# Patient Record
Sex: FEMALE | Race: BLACK OR AFRICAN AMERICAN | Employment: FULL TIME | URBAN - METROPOLITAN AREA
[De-identification: names, ages, dates, MRNs, and addresses within clinical notes are randomized per-mention and may not be internally consistent; named-entity substitution may affect disease eponyms.]

---

## 2018-12-18 ENCOUNTER — APPOINTMENT (OUTPATIENT)
Dept: ULTRASOUND IMAGING | Facility: HOSPITAL | Age: 23
End: 2018-12-18
Attending: EMERGENCY MEDICINE

## 2018-12-18 ENCOUNTER — HOSPITAL ENCOUNTER (EMERGENCY)
Facility: HOSPITAL | Age: 23
Discharge: HOME | End: 2018-12-18
Attending: EMERGENCY MEDICINE

## 2018-12-18 ENCOUNTER — APPOINTMENT (OUTPATIENT)
Dept: CT IMAGING | Facility: HOSPITAL | Age: 23
End: 2018-12-18
Attending: EMERGENCY MEDICINE

## 2018-12-18 VITALS
HEIGHT: 64 IN | SYSTOLIC BLOOD PRESSURE: 103 MMHG | WEIGHT: 140 LBS | HEART RATE: 71 BPM | OXYGEN SATURATION: 100 % | BODY MASS INDEX: 23.9 KG/M2 | RESPIRATION RATE: 16 BRPM | DIASTOLIC BLOOD PRESSURE: 65 MMHG | TEMPERATURE: 98 F

## 2018-12-18 DIAGNOSIS — R10.9 ABDOMINAL PAIN, ACUTE: Primary | ICD-10-CM

## 2018-12-18 DIAGNOSIS — N83.201 CYST OF RIGHT OVARY: ICD-10-CM

## 2018-12-18 PROCEDURE — 76830 TRANSVAGINAL US NON-OB: CPT | Performed by: EMERGENCY MEDICINE

## 2018-12-18 PROCEDURE — 99284 EMERGENCY DEPT VISIT MOD MDM: CPT

## 2018-12-18 PROCEDURE — 74177 CT ABD & PELVIS W/CONTRAST: CPT | Performed by: EMERGENCY MEDICINE

## 2018-12-18 PROCEDURE — 80048 BASIC METABOLIC PNL TOTAL CA: CPT | Performed by: EMERGENCY MEDICINE

## 2018-12-18 PROCEDURE — 93975 VASCULAR STUDY: CPT | Performed by: EMERGENCY MEDICINE

## 2018-12-18 PROCEDURE — 81025 URINE PREGNANCY TEST: CPT

## 2018-12-18 PROCEDURE — 76856 US EXAM PELVIC COMPLETE: CPT | Performed by: EMERGENCY MEDICINE

## 2018-12-18 PROCEDURE — 85025 COMPLETE CBC W/AUTO DIFF WBC: CPT | Performed by: EMERGENCY MEDICINE

## 2018-12-18 PROCEDURE — 80076 HEPATIC FUNCTION PANEL: CPT | Performed by: EMERGENCY MEDICINE

## 2018-12-18 PROCEDURE — 83690 ASSAY OF LIPASE: CPT | Performed by: EMERGENCY MEDICINE

## 2018-12-18 PROCEDURE — 81001 URINALYSIS AUTO W/SCOPE: CPT | Performed by: EMERGENCY MEDICINE

## 2018-12-18 PROCEDURE — 36415 COLL VENOUS BLD VENIPUNCTURE: CPT

## 2018-12-18 RX ORDER — IBUPROFEN 800 MG/1
800 TABLET ORAL EVERY 8 HOURS PRN
COMMUNITY

## 2018-12-18 RX ORDER — IBUPROFEN 600 MG/1
600 TABLET ORAL EVERY 6 HOURS PRN
Qty: 30 TABLET | Refills: 0 | Status: SHIPPED | OUTPATIENT
Start: 2018-12-18 | End: 2018-12-25

## 2018-12-18 RX ORDER — HYDROCODONE BITARTRATE AND ACETAMINOPHEN 5; 325 MG/1; MG/1
1-2 TABLET ORAL EVERY 6 HOURS PRN
Qty: 10 TABLET | Refills: 0 | Status: SHIPPED | OUTPATIENT
Start: 2018-12-18 | End: 2018-12-25

## 2018-12-18 NOTE — ED PROVIDER NOTES
Patient Seen in: [facility] AND [facility] Emergency Department    History   Patient presents with:  Abdomen/Flank Pain (GI/)    Stated Complaint:     HPI    59-year-old female presents with pain in the right lower quadrant that started this morning.   Pain is quadrant and suprapubic area. There is no rebound and no guarding. Musculoskeletal: Normal range of motion. She exhibits no edema or tenderness. Lymphadenopathy:     She has no cervical adenopathy.    Neurological: She is alert and oriented to person, p Disposition and Plan     Clinical Impression:  Abdominal pain, acute  (primary encounter diagnosis)  Cyst of right ovary    Disposition:  Discharge  12/18/2018  4:03 pm    Follow-up:  Charles Varela MD  [place] [id number] [phone]

## 2018-12-18 NOTE — ED INITIAL ASSESSMENT (HPI)
Pt to ER with c/o right lower quadrant abdominal pain with painful urination that all started this morning after running. States pain is worse when she stands.

## 2019-07-26 ENCOUNTER — APPOINTMENT (OUTPATIENT)
Dept: GENERAL RADIOLOGY | Facility: HOSPITAL | Age: 24
End: 2019-07-26
Payer: COMMERCIAL

## 2019-07-26 ENCOUNTER — HOSPITAL ENCOUNTER (EMERGENCY)
Facility: HOSPITAL | Age: 24
Discharge: HOME | End: 2019-07-26
Payer: COMMERCIAL

## 2019-07-26 VITALS
BODY MASS INDEX: 24.8 KG/M2 | WEIGHT: 140 LBS | DIASTOLIC BLOOD PRESSURE: 75 MMHG | OXYGEN SATURATION: 99 % | HEIGHT: 63 IN | RESPIRATION RATE: 15 BRPM | HEART RATE: 83 BPM | TEMPERATURE: 99 F | SYSTOLIC BLOOD PRESSURE: 107 MMHG

## 2019-07-26 DIAGNOSIS — S62.92XA HAND FRACTURE, LEFT, CLOSED, INITIAL ENCOUNTER: Primary | ICD-10-CM

## 2019-07-26 PROCEDURE — 99284 EMERGENCY DEPT VISIT MOD MDM: CPT

## 2019-07-26 PROCEDURE — 73130 X-RAY EXAM OF HAND: CPT

## 2019-07-26 PROCEDURE — 29125 APPL SHORT ARM SPLINT STATIC: CPT

## 2019-07-26 RX ORDER — IBUPROFEN 600 MG/1
600 TABLET ORAL ONCE
Status: COMPLETED | OUTPATIENT
Start: 2019-07-26 | End: 2019-07-26
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
## 2019-07-26 NOTE — ED NOTES
Pt complains of pain, numbness, and limited mobility in left hand after \"slamming it in the trunk\". CMS intact.

## 2019-07-26 NOTE — ED PROVIDER NOTES
Patient Seen in: [facility] AND [facility] Emergency Department    History   Patient presents with:  Upper Extremity Injury (musculoskeletal)    Stated Complaint:     HPI    HPI: Braden Weber is a 21year old female who presents after an injury to the L hand NEURO:Sensation to touch is intact. SKIN: No open wounds, no rashes. PSYCH: Normal affect. Calm and cooperative.     Differential diagnosis to include fracture vs. Strain/sprain vs. contusion    ED Course   Labs Reviewed - No data to display    MDM

## 2019-08-14 ENCOUNTER — HOSPITAL ENCOUNTER (OUTPATIENT)
Dept: GENERAL RADIOLOGY | Facility: HOSPITAL | Age: 24
Discharge: HOME | End: 2019-08-14
Attending: SPECIALIST
Payer: COMMERCIAL

## 2019-08-14 DIAGNOSIS — S62.307S: ICD-10-CM

## 2019-08-14 PROCEDURE — 73130 X-RAY EXAM OF HAND: CPT | Performed by: SPECIALIST

## 2019-08-28 ENCOUNTER — HOSPITAL ENCOUNTER (OUTPATIENT)
Dept: GENERAL RADIOLOGY | Facility: HOSPITAL | Age: 24
Discharge: HOME | End: 2019-08-28
Attending: SPECIALIST
Payer: COMMERCIAL

## 2019-08-28 DIAGNOSIS — S62.92XA LEFT HAND FRACTURE: ICD-10-CM

## 2019-08-28 PROCEDURE — 73130 X-RAY EXAM OF HAND: CPT | Performed by: SPECIALIST

## 2019-09-11 ENCOUNTER — HOSPITAL ENCOUNTER (OUTPATIENT)
Dept: GENERAL RADIOLOGY | Facility: HOSPITAL | Age: 24
Discharge: HOME | End: 2019-09-11
Attending: SPECIALIST
Payer: COMMERCIAL

## 2019-09-11 DIAGNOSIS — T14.8XXA FRACTURE: ICD-10-CM

## 2019-09-11 PROCEDURE — 73130 X-RAY EXAM OF HAND: CPT | Performed by: SPECIALIST

## 2021-04-16 ENCOUNTER — HOSPITAL ENCOUNTER (EMERGENCY)
Facility: HOSPITAL | Age: 26
Discharge: HOME | End: 2021-04-16
Attending: EMERGENCY MEDICINE

## 2021-04-16 VITALS
OXYGEN SATURATION: 98 % | WEIGHT: 184 LBS | HEIGHT: 63 IN | SYSTOLIC BLOOD PRESSURE: 119 MMHG | DIASTOLIC BLOOD PRESSURE: 74 MMHG | RESPIRATION RATE: 16 BRPM | TEMPERATURE: 98 F | BODY MASS INDEX: 32.6 KG/M2 | HEART RATE: 85 BPM

## 2021-04-16 DIAGNOSIS — H04.302 DACROCYSTITIS, LEFT: Primary | ICD-10-CM

## 2021-04-16 PROCEDURE — 99283 EMERGENCY DEPT VISIT LOW MDM: CPT

## 2021-04-16 RX ORDER — CLINDAMYCIN HYDROCHLORIDE 300 MG/1
300 CAPSULE ORAL 3 TIMES DAILY
Qty: 21 CAPSULE | Refills: 0 | Status: SHIPPED | OUTPATIENT
Start: 2021-04-16 | End: 2021-04-23

## 2021-04-16 RX ORDER — POLYMYXIN B SULFATE AND TRIMETHOPRIM 1; 10000 MG/ML; [USP'U]/ML
1 SOLUTION OPHTHALMIC
Qty: 10 ML | Refills: 0 | Status: SHIPPED | OUTPATIENT
Start: 2021-04-16 | End: 2021-04-21

## 2021-04-16 NOTE — ED PROVIDER NOTES
Patient Seen in: [facility] AND [facility] Emergency Department      History   Patient presents with:  Eye Problem    Stated Complaint: L eye itchy/runny    HPI/Subjective:   HPI    48-year-old female with no significant past medical history here with complaints Temp src Oral   SpO2 98 %   O2 Device None (Room air)       Current:/74   Pulse 85   Temp 98.3 °F (36.8 °C) (Oral)   Resp 16   Ht 160 cm (5' 3\")   Wt 83.5 kg   LMP 03/25/2021   SpO2 98%   BMI 32.59 kg/m²         Physical Exam  Vitals and nursing n daily usage, considering nicotine replacement therapy and recommended the patient follow-up with their primary care physician to discuss medications such as Chantix and others to assist with smoking cessation.         Medical Record Review: I personally rev

## 2023-10-23 ENCOUNTER — HOSPITAL ENCOUNTER (EMERGENCY)
Facility: HOSPITAL | Age: 28
Discharge: HOME | End: 2023-10-23
Attending: STUDENT IN AN ORGANIZED HEALTH CARE EDUCATION/TRAINING PROGRAM
Payer: MEDICAID

## 2023-10-23 VITALS
SYSTOLIC BLOOD PRESSURE: 110 MMHG | RESPIRATION RATE: 18 BRPM | TEMPERATURE: 98 F | HEART RATE: 56 BPM | DIASTOLIC BLOOD PRESSURE: 74 MMHG | OXYGEN SATURATION: 100 %

## 2023-10-23 DIAGNOSIS — N61.1 BREAST ABSCESS: Primary | ICD-10-CM

## 2023-10-23 LAB — GLUCOSE BLDC GLUCOMTR-MCNC: 86 MG/DL (ref 70–99)

## 2023-10-23 PROCEDURE — 99284 EMERGENCY DEPT VISIT MOD MDM: CPT

## 2023-10-23 PROCEDURE — 82962 GLUCOSE BLOOD TEST: CPT

## 2023-10-23 RX ORDER — DOXYCYCLINE HYCLATE 100 MG/1
100 CAPSULE ORAL 2 TIMES DAILY
Qty: 14 CAPSULE | Refills: 0 | Status: SHIPPED | OUTPATIENT
Start: 2023-10-23 | End: 2023-10-30

## 2023-10-23 RX ORDER — IBUPROFEN 600 MG/1
600 TABLET ORAL ONCE
Status: COMPLETED | OUTPATIENT
Start: 2023-10-23 | End: 2023-10-23

## 2023-10-23 NOTE — ED INITIAL ASSESSMENT (HPI)
S: pt presents with redness and swelling to right breast x 2 days, believes she was bit by something. Denies drainage.

## 2023-10-23 NOTE — DISCHARGE INSTRUCTIONS
Thank you for seeking care at [facility] Emergency Department. You have been seen and evaluated and treated for an abscess. We discussed the results of your evaluation and work-up in the emergency department   Please read the instructions provided   If given prescriptions, take as instructed   Please keep the area clean, and wash the area gently with mild soap and warm water twice daily. Not all abscesses require antibiotics, but if antibiotics were prescribed, please complete the course of treatment as directed. Remember, your care process does not end after your visit today. Please follow-up with your doctor within 1-2 days for a follow-up check to ensure your symptoms are improving, to see if you need any further evaluation/testing, or to evaluate for any alternate diagnoses. If you notice increasing pain, increasing redness around the wound, increasing drainage, feeling lightheaded or dizzy, heavy bleeding, begin having fevers or chills, or feeling very ill you should contact your doctor or return to the emergency department.

## 2023-10-31 ENCOUNTER — HOSPITAL ENCOUNTER (EMERGENCY)
Facility: HOSPITAL | Age: 28
Discharge: HOME | End: 2023-10-31
Attending: EMERGENCY MEDICINE
Payer: MEDICAID

## 2023-10-31 VITALS
OXYGEN SATURATION: 99 % | SYSTOLIC BLOOD PRESSURE: 107 MMHG | HEART RATE: 50 BPM | TEMPERATURE: 98 F | RESPIRATION RATE: 18 BRPM | DIASTOLIC BLOOD PRESSURE: 82 MMHG

## 2023-10-31 DIAGNOSIS — N61.1 ABSCESS OF BREAST: Primary | ICD-10-CM

## 2023-10-31 PROCEDURE — 99283 EMERGENCY DEPT VISIT LOW MDM: CPT

## 2023-10-31 PROCEDURE — 99282 EMERGENCY DEPT VISIT SF MDM: CPT

## 2023-10-31 PROCEDURE — 99284 EMERGENCY DEPT VISIT MOD MDM: CPT

## 2023-10-31 RX ORDER — IBUPROFEN 600 MG/1
600 TABLET ORAL ONCE
Status: COMPLETED | OUTPATIENT
Start: 2023-10-31 | End: 2023-10-31

## 2023-10-31 NOTE — ED INITIAL ASSESSMENT (HPI)
Patient aox3 to ed via ems co of right breast abscess, reported was dx with breast abscess, given abx, followed with with clinic at [facility] and had ind. Patient reported was told to remove packing hersef but stated was bleeding and \"gauze\" was stuck.

## 2023-10-31 NOTE — DISCHARGE INSTRUCTIONS
Remove the packing in 3 days. He may then cover it with regular gauze. Please follow-up with your surgeon for wound reevaluation to ensure that it is healing well.

## 2023-11-16 ENCOUNTER — HOSPITAL ENCOUNTER (EMERGENCY)
Facility: HOSPITAL | Age: 28
Discharge: LEFT BEFORE DISCHARGE | End: 2023-11-16
Payer: MEDICAID

## 2023-11-16 VITALS
BODY MASS INDEX: 23.32 KG/M2 | OXYGEN SATURATION: 100 % | DIASTOLIC BLOOD PRESSURE: 71 MMHG | TEMPERATURE: 98 F | RESPIRATION RATE: 18 BRPM | HEIGHT: 65 IN | HEART RATE: 81 BPM | SYSTOLIC BLOOD PRESSURE: 120 MMHG | WEIGHT: 140 LBS
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
## 2024-05-01 ENCOUNTER — HOSPITAL ENCOUNTER (EMERGENCY)
Facility: HOSPITAL | Age: 29
Discharge: HOME | End: 2024-05-01
Attending: EMERGENCY MEDICINE

## 2024-05-01 VITALS
RESPIRATION RATE: 18 BRPM | HEART RATE: 70 BPM | OXYGEN SATURATION: 98 % | DIASTOLIC BLOOD PRESSURE: 85 MMHG | TEMPERATURE: 98 F | WEIGHT: 160 LBS | SYSTOLIC BLOOD PRESSURE: 115 MMHG | BODY MASS INDEX: 28.35 KG/M2 | HEIGHT: 63 IN

## 2024-05-01 DIAGNOSIS — R10.2 VAGINAL PAIN: Primary | ICD-10-CM

## 2024-05-01 LAB
B-HCG UR QL: NEGATIVE
BILIRUB UR QL: NEGATIVE
BV BACTERIA DNA VAG QL NAA+PROBE: POSITIVE
C GLABRATA DNA VAG QL NAA+PROBE: NEGATIVE
C KRUSEI DNA VAG QL NAA+PROBE: NEGATIVE
CANDIDA DNA VAG QL NAA+PROBE: NEGATIVE
CLARITY UR: CLEAR
COLOR UR: COLORLESS
GLUCOSE UR-MCNC: NORMAL MG/DL
HGB UR QL STRIP.AUTO: NEGATIVE
KETONES UR-MCNC: NEGATIVE MG/DL
LEUKOCYTE ESTERASE UR QL STRIP.AUTO: 25
NITRITE UR QL STRIP.AUTO: NEGATIVE
PH UR: 5.5 [PH] (ref 5–8)
PROT UR-MCNC: NEGATIVE MG/DL
SP GR UR STRIP: 1.01 (ref 1–1.03)
T VAGINALIS DNA VAG QL NAA+PROBE: NEGATIVE
UROBILINOGEN UR STRIP-ACNC: NORMAL

## 2024-05-01 PROCEDURE — 87491 CHLMYD TRACH DNA AMP PROBE: CPT | Performed by: EMERGENCY MEDICINE

## 2024-05-01 PROCEDURE — 99283 EMERGENCY DEPT VISIT LOW MDM: CPT

## 2024-05-01 PROCEDURE — 87591 N.GONORRHOEAE DNA AMP PROB: CPT | Performed by: EMERGENCY MEDICINE

## 2024-05-01 PROCEDURE — 81001 URINALYSIS AUTO W/SCOPE: CPT | Performed by: EMERGENCY MEDICINE

## 2024-05-01 PROCEDURE — 81514 NFCT DS BV&VAGINITIS DNA ALG: CPT | Performed by: EMERGENCY MEDICINE

## 2024-05-01 PROCEDURE — 99284 EMERGENCY DEPT VISIT MOD MDM: CPT

## 2024-05-01 PROCEDURE — 87086 URINE CULTURE/COLONY COUNT: CPT | Performed by: EMERGENCY MEDICINE

## 2024-05-01 PROCEDURE — 81025 URINE PREGNANCY TEST: CPT

## 2024-05-01 NOTE — ED INITIAL ASSESSMENT (HPI)
Patient states that she would like to get checked for STDs. Patient denies any vaginal discharge. Intermittent abdominal pain over the weekend. No fevers or vomiting.

## 2024-05-02 LAB
C TRACH DNA SPEC QL NAA+PROBE: NEGATIVE
N GONORRHOEA DNA SPEC QL NAA+PROBE: POSITIVE

## 2024-05-02 RX ORDER — METRONIDAZOLE 500 MG/1
500 TABLET ORAL 2 TIMES DAILY
Qty: 14 TABLET | Refills: 0 | Status: SHIPPED | OUTPATIENT
Start: 2024-05-02 | End: 2024-05-09

## 2024-05-02 NOTE — ED PROVIDER NOTES
Patient Seen in: [facility] Emergency Department      History     Chief Complaint   Patient presents with    Eval-G     Stated Complaint: possible STD exposure/no symptoms/ vaginal irritation no discharge    Subjective:   HPI    29-year-old otherwise healthy female presents for evaluation of vaginal pain.  Patient reports occasional episodes of vaginal discomfort.  Describes it as an irritation.  She denies itching or change in discharge.  She occasionally has discomfort with intercourse.  No fever, chills, nausea, vomiting, abdominal pain, diarrhea, dysuria.    Objective:   Past Medical History:    History of lumbar puncture    Migraines              History reviewed. No pertinent surgical history.             Social History     Socioeconomic History    Marital status: Single   Tobacco Use    Smoking status: Some Days    Smokeless tobacco: Never    Tobacco comments:     marijuana, not tobacco cigarettes.     Social Determinants of Health     Food Insecurity: No Food Insecurity (10/26/2023)    Received from [facility], [facility]    Food Insecurity     Within the past 30 days, I worried whether my food would run out before I got money to buy more. / En los últimos 30 días, me preocupó que la comida se podía acabar antes de tener dinero para compr...: Never true / Nunca     Within the past 30 days, the food that I bought just didn't last, and I didn't have money to get more. / En los últimos 30 días, La comida que compré no rindió lo suficiente, y no tenía dinero para...: Never true / Nunca              Review of Systems    Positive for stated complaint: possible STD exposure/no symptoms/ vaginal irritation no discharge  Other systems are as noted in HPI.  Constitutional and vital signs reviewed.      All other systems reviewed and negative except as noted above.    Physical Exam     ED Triage Vitals [05/01/24 0741]   /73   Pulse 72   Resp 20   Temp 97.6 °F  (36.4 °C)   Temp src Temporal   SpO2 99 %   O2 Device None (Room air)       Current:/85   Pulse 70   Temp 97.6 °F (36.4 °C) (Temporal)   Resp 18   Ht 160 cm (5' 3\")   Wt 72.6 kg   LMP 04/15/2024 (Exact Date)   SpO2 98%   BMI 28.34 kg/m²         Physical Exam  Vitals and nursing note reviewed.   Constitutional:       General: She is not in acute distress.     Appearance: She is well-developed.   HENT:      Head: Normocephalic and atraumatic.   Eyes:      Conjunctiva/sclera: Conjunctivae normal.   Cardiovascular:      Rate and Rhythm: Normal rate and regular rhythm.      Heart sounds: Normal heart sounds.   Pulmonary:      Effort: Pulmonary effort is normal. No respiratory distress.      Breath sounds: Normal breath sounds.   Abdominal:      General: Bowel sounds are normal. There is no distension.      Palpations: Abdomen is soft.      Tenderness: There is no abdominal tenderness. There is no guarding or rebound.   Genitourinary:     Comments: White fluid in vaginal vault, swabs collected, no CMT or adnexal tenderness bilaterally, no lesions  Musculoskeletal:         General: Normal range of motion.      Cervical back: Normal range of motion and neck supple.   Skin:     General: Skin is warm and dry.      Findings: No rash.   Neurological:      General: No focal deficit present.      Mental Status: She is alert and oriented to person, place, and time.               ED Course     Labs Reviewed   URINALYSIS WITH CULTURE REFLEX - Abnormal; Notable for the following components:       Result Value    Urine Color Colorless (*)     Leukocyte Esterase Urine 25 (*)     Squamous Epi. Cells Few (*)     All other components within normal limits   VAGINITIS VAGINOSIS PCR PANEL - Abnormal; Notable for the following components:    Bacterial Vaginosis Positive (*)     All other components within normal limits    Narrative:     This assay utilizes RT-PCR to detect the DNA of Gardnerella vaginalis, Lactobacillus spp. (L.  crispatus and L. jensenii), Atopobium vaginae, Bacterial Vaginosis Associated Bacteria-2 (BVAB-2), and Megasphaera-1. An algorithm is used to determine if the targets detected represent a vaginal microbiome consistent with bacterial vaginosis or normal vaginal van.  FDA approval was based on data in the 18 years and over population.       POCT PREGNANCY URINE - Normal   URINE CULTURE, ROUTINE   CHLAMYDIA/GONOCOCCUS, MARLO             Vitals:    05/01/24 0741 05/01/24 1009   BP: 113/73 115/85   Pulse: 72 70   Resp: 20 18   Temp: 97.6 °F (36.4 °C)    TempSrc: Temporal    SpO2: 99% 98%   Weight: 72.6 kg    Height: 160 cm (5' 3\")      *I personally reviewed and interpreted all ED vitals.           MDM        Medical Decision Making  Differential diagnosis includes but is not limited to STI, yeast, vaginitis, cystitis    Well-appearing patient, afebrile urine with isolated, 25 leukoesterase but no bacteria, will await cultures prior to making a decision about initiating antibiotics.  Discussed this plan with patient, advised outpatient follow-up with gynecology if not improving.  Patient verbalizes understanding of and agreement with this plan.    Problems Addressed:  Vaginal pain: acute illness or injury    Amount and/or Complexity of Data Reviewed  Labs: ordered.        Disposition and Plan     Clinical Impression:  1. Vaginal pain         Disposition:  Discharge  5/1/2024  9:59 am    Follow-up:  Lawrence Mayfield DO  [address] [facility] [address]  [phone]    Follow up            Medications Prescribed:  Discharge Medication List as of 5/1/2024 10:10 AM

## 2024-05-02 NOTE — PROGRESS NOTES
ED Culture Callback Results Review    Pharmacist reviewed culture results from ED visit .    Final urine culture, vaginal panel positive for untreated bacterial vaginosis and gonorrhea. Results discussed with Dr. Avina and recommend patient return to the ED, urgent care, or immediate care center for additional treatment for gonorrhea. A prescription for metronidazole was sent to [facility].      Patient was contacted and informed of the results. Patient was educated to inform all sexual partners from the previous 60-days prior to symptom onset of patient’s positive status and to abstain from sexual activity for 7 days after the end of therapy to prevent further spread. Patient verbalized understanding and all questions were answered. No further intervention required at this time.       Anjelica Peoples PharmD  Emergency Medicine Pharmacist Specialist  05/02/24; 2:12 PM

## 2024-05-03 ENCOUNTER — HOSPITAL ENCOUNTER (EMERGENCY)
Facility: HOSPITAL | Age: 29
Discharge: HOME | End: 2024-05-03
Attending: EMERGENCY MEDICINE

## 2024-05-03 VITALS
OXYGEN SATURATION: 99 % | TEMPERATURE: 98 F | RESPIRATION RATE: 18 BRPM | SYSTOLIC BLOOD PRESSURE: 103 MMHG | DIASTOLIC BLOOD PRESSURE: 70 MMHG | BODY MASS INDEX: 28.35 KG/M2 | HEART RATE: 95 BPM | WEIGHT: 160 LBS | HEIGHT: 63 IN

## 2024-05-03 DIAGNOSIS — A64 STD (FEMALE): Primary | ICD-10-CM

## 2024-05-03 PROCEDURE — 99284 EMERGENCY DEPT VISIT MOD MDM: CPT

## 2024-05-03 PROCEDURE — 96372 THER/PROPH/DIAG INJ SC/IM: CPT

## 2024-05-03 PROCEDURE — 99283 EMERGENCY DEPT VISIT LOW MDM: CPT

## 2024-05-03 NOTE — ED INITIAL ASSESSMENT (HPI)
Pt returns to ER for medication administration. Pt had STD testing done 2 days ago,  resulted +vaginitis and gonorrhea.

## 2024-05-03 NOTE — ED PROVIDER NOTES
Patient Seen in: [facility]         EMERGENCY DEPARTMENT NOTE    Dictated. Voice Transcription software has been utilized for this dictation (the reader should be aware that typographical errors are possible with voice transcription software and to please contact the dictating physician if there are questions.)         History     Chief Complaint   Patient presents with    Abnormal Result    Medication Administration       There may be discrepancies from triage note.     HPI    History provided by patient.  29-year-old immunocompetent female request treatment for gonorrhea and vaginitis.  States that she had STD panel completed in the emergency room a couple days ago and it tested positive for gonorrhea and vaginitis.  Upon chart review it appears patient is positive for gonorrhea and BV.  Patient has no symptoms.  No abdominal pain, no pelvic pain.  Denies vaginal discharge.  Reports immunocompetence.    No fevers, chills, nausea, vomiting, diarrhea, constipation, cough, cold symptoms, urinary complaints.  No chest pain, shortness of breath  No headache, neck pain, neck stiffness, incontinence.  No changes in mentation, no changes in vision, no total/new extremity weakness, no total/new extremity paresthesia, no difficulty speaking.  No alleviating or exacerbating factors.  Denies orthopnea, pnd, change in exercise tolerance limited by chest pain/sob , lower extremity edema/asymmetry.     History reviewed.   Past Medical History:    History of lumbar puncture    Migraines       History reviewed. History reviewed. No pertinent surgical history.      Medications :  (Not in a hospital admission)       No family history on file.    Smoking Status:   Social History     Socioeconomic History    Marital status: Single   Tobacco Use    Smoking status: Some Days    Smokeless tobacco: Never    Tobacco comments:     marijuana, not tobacco cigarettes.   Vaping Use    Vaping status: Never Used       Review of Systems    Constitutional: Negative.    HENT: Negative.     Eyes: Negative.    Respiratory: Negative.     Cardiovascular: Negative.    Gastrointestinal: Negative.    Genitourinary: Negative.    Musculoskeletal: Negative.    Skin: Negative.    Neurological: Negative.    Endo/Heme/Allergies: Negative.    Psychiatric/Behavioral: Negative.     All other systems reviewed and are negative.    Pertinent positives as listed.  All other organ systems are reviewed and are negative.    Constitutional and vital signs reviewed.      Social History and Family History elements reviewed from today, pertinent positives to the presenting problem noted.    Physical Exam     ED Triage Vitals [05/03/24 0818]   /70   Pulse 95   Resp 18   Temp 97.6 °F (36.4 °C)   Temp src Temporal   SpO2 99 %   O2 Device None (Room air)       All measures to prevent infection transmission during my interaction with the patient were taken. The patient was already wearing a droplet mask on my arrival to the room. Personal protective equipment including droplet mask, eye protection, and gloves were worn throughout the duration of the exam.  Handwashing was performed prior to and after the exam.  Stethoscope and any equipment used during my examination was cleaned with super sani-cloth germicidal wipes following the exam.     Physical Exam  Vitals and nursing note reviewed.   Constitutional:       General: She is not in acute distress.     Appearance: She is not ill-appearing or toxic-appearing.   Cardiovascular:      Rate and Rhythm: Normal rate and regular rhythm.   Pulmonary:      Effort: Pulmonary effort is normal. No respiratory distress.   Abdominal:      General: There is no distension.      Palpations: Abdomen is soft.      Tenderness: There is no abdominal tenderness. There is no guarding or rebound.      Comments: Negative Gaviria sign, negative McBurney's point tenderness     Musculoskeletal:      Right lower leg: No edema.      Left lower leg: No  edema.   Skin:     Capillary Refill: Capillary refill takes less than 2 seconds.   Neurological:      Mental Status: She is alert.      Comments: Gross motor and sensory function intact symmetrically and bilaterally to upper extremities and lower extremities.   Psychiatric:         Mood and Affect: Mood normal.         Behavior: Behavior normal.           Review of prior notes in Care everywhere/Epic performed by myself:        -Upon chart review, it appears patient was seen in the emergency department 5/2/2024 and had STD cultures sent.  It appears that thereafter  Dr. Avina already prescribed Flagyl p.o. twice daily for 7 days  ED Course     If labs obtained, they are personally reviewed by myself:   Labs Reviewed - No data to display    If radiologic studies ordered during today's ER visit, my independent interpretation are seen directly below.  This is awaiting the radiologist's final interpretation.      Imaging Results read by radiology in ED: No results found.        ED Medications Administered:   Medications   cefTRIAXone (Rocephin) 500 mg in lidocaine PF (Xylocaine-MPF) 1 % IM syringe for [facility] ED (500 mg Intramuscular Given 5/3/24 1008)           Vitals:    05/03/24 0818   BP: 103/70   Pulse: 95   Resp: 18   Temp: 97.6 °F (36.4 °C)   TempSrc: Temporal   SpO2: 99%   Weight: 72.6 kg   Height: 160 cm (5' 3\")     *I personally reviewed and interpreted all ED vitals.    Pulse Ox interpretation by myself: 99%, Room air, Normal   Medical Record Review: I personally reviewed available prior medical records for any recent pertinent discharge summaries, testing, and procedures and reviewed those reports.      [facility]     Medical decision making/ED Course:   29-year-old immunocompetent female requesting treatment for gonorrhea and bacterial vaginosis.  Flagyl already prescribed.  Patient encouraged to take this medication twice a day for 7 days.  Will give ceftriaxone intramuscularly x 1 here for gonorrhea.  Patient  encouraged to follow-up with primary/gynecology to be further tested for syphilis, HIV/hepatitis.  Safe sex practices discussed.  Strict return instructions given.  Patient with no vaginal discharge, pelvic pain, abdominal pain to suggest PID.    PID, torsion cholecystitis, AAA, dissection, pancreatitis, mesenteric ischemia, volvulus, bowel obstruction, appendicitis, among other life-threatening medical conditions considered and seems unlikely given patient's history, exam, and appearance.    Strict return instructions given.  Patient encouraged to follow-up with primary care provider in the next few days.  Advised to return to the emergency department for any worsening symptoms    Patient is non toxic appearing, is in no distress, hemodynamically stable.  Pt agrees and is aware of plan.       Differential Diagnosis:  as listed above in medical decision making.   *Please note that in the presenting to the emergency department, illness/injury that poses a threat to life or function is considered during this patient's initial evaluation.    The complexity of this visit is therefore inherently more complex given the need to consider life threatening pathology prior to any other etiology for this patient's visit.    The differential diagnosis and medical decision above exemplify this rationale.       Medical Decision Making  Problems Addressed:  STD (female): acute illness or injury    Amount and/or Complexity of Data Reviewed  External Data Reviewed: labs and notes.  Labs:  Decision-making details documented in ED Course.    Risk  Prescription drug management.               Vitals:    05/03/24 0818   BP: 103/70   Pulse: 95   Resp: 18   Temp: 97.6 °F (36.4 °C)   TempSrc: Temporal   SpO2: 99%   Weight: 72.6 kg   Height: 160 cm (5' 3\")             Complicating Factors: Significant medical problems that contribute to the complexity of this emergency room evaluation is listed above.    Condition upon leaving the department:  Stable    Disposition and Plan     Clinical Impression:  1. STD (female)        Disposition:  Discharge    Medications Prescribed:  Current Discharge Medication List          I have discussed the discharge plan with the patient and/or family or well wisher present in the room with the patient's permission.  They state that they understand and agree with the plan.  All questions regarding their care have been answered prior to discharge.  They are aware: Emergency Department is not intended to be a substitute for an effort to provide complete medical care. The imaging, if any, have often been interpreted on a preliminary basis pending final reading by the radiologist.  Instructed to return immediately to the ED if any changes or worsening of condition should occur.  If patient's blood pressure was greater than 140/90 today, patient encouraged to have this blood pressure rechecked with primary MD and blood pressure education provided.

## 2024-05-03 NOTE — ED QUICK NOTES
Pt able to dress independently and ambulates with steady gait. Discharge instructions reviewed and pt verbalized understanding.    
Pt states had positive STD testing (vaginitis and Gonorrhea) and here for medications.   
done

## 2024-05-03 NOTE — DISCHARGE INSTRUCTIONS
Please follow with your primary care provider in the next few days for reevaluation of your symptoms.  Please also get tested for syphilis, hepatitis, HIV with your primary care provider.  Return to emergency room for any worsening symptoms, pelvic pain, fevers of 100.4.  Follow with gynecology as an outpatient.      The Emergency Department is not intended to be a substitute for an effort to provide complete medical care. The imaging, if any, have often been interpreted on a preliminary basis pending final reading by the radiologist. If your blood pressure was greater than 140/90, please have this blood pressure rechecked by your primary care provider in the next several days.

## 2024-09-17 ENCOUNTER — HOSPITAL ENCOUNTER (EMERGENCY)
Facility: HOSPITAL | Age: 29
Discharge: HOME | End: 2024-09-17
Attending: EMERGENCY MEDICINE

## 2024-09-17 VITALS
HEART RATE: 67 BPM | OXYGEN SATURATION: 99 % | TEMPERATURE: 97 F | SYSTOLIC BLOOD PRESSURE: 119 MMHG | DIASTOLIC BLOOD PRESSURE: 85 MMHG | RESPIRATION RATE: 18 BRPM

## 2024-09-17 DIAGNOSIS — Z20.2 POSSIBLE EXPOSURE TO STD: ICD-10-CM

## 2024-09-17 DIAGNOSIS — N30.00 ACUTE CYSTITIS WITHOUT HEMATURIA: Primary | ICD-10-CM

## 2024-09-17 LAB
BILIRUB UR QL: NEGATIVE
COLOR UR: YELLOW
GLUCOSE UR-MCNC: NORMAL MG/DL
KETONES UR-MCNC: NEGATIVE MG/DL
LEUKOCYTE ESTERASE UR QL STRIP.AUTO: 500
NITRITE UR QL STRIP.AUTO: NEGATIVE
PH UR: 6.5 [PH] (ref 5–8)
SP GR UR STRIP: 1.01 (ref 1–1.03)
UROBILINOGEN UR STRIP-ACNC: NORMAL
WBC #/AREA URNS AUTO: >50 /HPF

## 2024-09-17 PROCEDURE — 87186 SC STD MICRODIL/AGAR DIL: CPT | Performed by: EMERGENCY MEDICINE

## 2024-09-17 PROCEDURE — 81001 URINALYSIS AUTO W/SCOPE: CPT | Performed by: EMERGENCY MEDICINE

## 2024-09-17 PROCEDURE — 87088 URINE BACTERIA CULTURE: CPT | Performed by: EMERGENCY MEDICINE

## 2024-09-17 PROCEDURE — 87086 URINE CULTURE/COLONY COUNT: CPT | Performed by: EMERGENCY MEDICINE

## 2024-09-17 PROCEDURE — 87591 N.GONORRHOEAE DNA AMP PROB: CPT | Performed by: EMERGENCY MEDICINE

## 2024-09-17 PROCEDURE — 99284 EMERGENCY DEPT VISIT MOD MDM: CPT

## 2024-09-17 PROCEDURE — 87491 CHLMYD TRACH DNA AMP PROBE: CPT | Performed by: EMERGENCY MEDICINE

## 2024-09-17 PROCEDURE — 96372 THER/PROPH/DIAG INJ SC/IM: CPT

## 2024-09-17 PROCEDURE — 99283 EMERGENCY DEPT VISIT LOW MDM: CPT

## 2024-09-17 RX ORDER — DOXYCYCLINE 100 MG/1
100 CAPSULE ORAL 2 TIMES DAILY
Qty: 14 CAPSULE | Refills: 0 | Status: SHIPPED | OUTPATIENT
Start: 2024-09-17 | End: 2024-09-24

## 2024-09-17 NOTE — ED INITIAL ASSESSMENT (HPI)
Patient presents with UTI symptoms : frequency, spasms, not fully emptying  For about 2 weeks.     Took medication for UTI about 3 weeks ago and didn't work     Looking for STD testing as well.

## 2024-09-17 NOTE — ED PROVIDER NOTES
Patient Seen in: [facility] Emergency Department    History     Chief Complaint   Patient presents with    Urinary Symptoms           Eval-G       HPI    29-year-old female presents ER with complaints of burning with urination.  Patient states that she also had intercourse with her boyfriend who she is broken up with so she is concerned about STDs.  Patient complaining of spasming of her bladder.  Patient states he was recently treated for urinary tract infections and symptoms improved but then now come back.  Patient complain of cloudy urine as well.    History reviewed.   Past Medical History:    History of lumbar puncture    Migraines       History reviewed. History reviewed. No pertinent surgical history.      Medications :  (Not in a hospital admission)       History reviewed. No pertinent family history.    Smoking Status:   Social History     Socioeconomic History    Marital status: Single   Tobacco Use    Smoking status: Some Days    Smokeless tobacco: Never    Tobacco comments:     marijuana, not tobacco cigarettes.   Vaping Use    Vaping status: Never Used       ROS  All pertinent positives for the review of systems are mentioned in the HPI  All other organ systems are reviewed and are negative.    Constitutional and vital signs reviewed.      Social History and Family History elements reviewed from today, pertinent positives to the presenting problem noted.    Physical Exam     ED Triage Vitals [09/17/24 0637]   /52   Pulse 89   Resp 22   Temp 97.4 °F (36.3 °C)   Temp src Temporal   SpO2 97 %   O2 Device None (Room air)       All measures to prevent infection transmission during my interaction with the patient were taken. The patient was already wearing a droplet mask on my arrival to the room. Personal protective equipment including droplet mask, eye protection, and gloves were worn throughout the duration of the exam.  Handwashing was performed prior to and after the exam.  Stethoscope and  any equipment used during my examination was cleaned with super sani-cloth germicidal wipes following the exam.     Physical Exam  Vitals and nursing note reviewed.   Constitutional:       Appearance: She is well-developed.   HENT:      Head: Normocephalic and atraumatic.      Right Ear: External ear normal.      Left Ear: External ear normal.      Nose: Nose normal.   Eyes:      Conjunctiva/sclera: Conjunctivae normal.      Pupils: Pupils are equal, round, and reactive to light.   Cardiovascular:      Rate and Rhythm: Normal rate and regular rhythm.      Heart sounds: Normal heart sounds.   Pulmonary:      Effort: Pulmonary effort is normal.      Breath sounds: Normal breath sounds.   Abdominal:      General: Bowel sounds are normal. There is no distension.      Palpations: Abdomen is soft. There is no mass.      Tenderness: There is no abdominal tenderness.      Hernia: No hernia is present.   Musculoskeletal:         General: Normal range of motion.      Cervical back: Normal range of motion and neck supple.   Skin:     General: Skin is warm and dry.   Neurological:      Mental Status: She is alert and oriented to person, place, and time.      Deep Tendon Reflexes: Reflexes are normal and symmetric.   Psychiatric:         Behavior: Behavior normal.         Thought Content: Thought content normal.         Judgment: Judgment normal.         ED Course        Labs Reviewed   URINALYSIS WITH CULTURE REFLEX - Abnormal; Notable for the following components:       Result Value    Clarity Urine Ex.Turbid (*)     Blood Urine 1+ (*)     Protein Urine Trace (*)     Leukocyte Esterase Urine 500 (*)     WBC Urine >50 (*)     RBC Urine 3-5 (*)     Squamous Epi. Cells Few (*)     All other components within normal limits   URINE CULTURE, ROUTINE   CHLAMYDIA/GONOCOCCUS, MARLO         Imaging Results Available and Reviewed while in ED: No results found.  ED Medications Administered:   Medications   cefTRIAXone (Rocephin) 500 mg in  lidocaine PF (Xylocaine-MPF) 1 % IM syringe for [facility] ED (500 mg Intramuscular Given 9/17/24 0801)         MDM     Vitals:    09/17/24 0637   BP: 112/52   Pulse: 89   Resp: 22   Temp: 97.4 °F (36.3 °C)   TempSrc: Temporal   SpO2: 97%     *I personally reviewed and interpreted all ED vitals.  I also personally reviewed all labs and imaging if ordered    Pulse Ox: 97%, Room air, Normal     Monitor Interpretation:   normal sinus rhythm    Differential Diagnosis/ Diagnostic Considerations: UTI, STD exposure, bladder spasm    Medical Record Review: I personally reviewed available prior medical records for any recent pertinent discharge summaries, testing, and procedures and reviewed those reports.    Complicating Factors: The patient already has does not have a problem list on file. to contribute to the complexity of this ED evaluation.    Medical Decision Making  29-year-old female presents ER with complaint of burning with urination.  Patient with a positive UTI.  Patient requesting STD testing.  Culture sent for GC and chlamydia.  Patient treated with Rocephin for 500 mg IM and given prescription for doxycycline for 7 days.  Patient instructed to follow-up with the results of her culture on her MyChart.    Problems Addressed:  Acute cystitis without hematuria: acute illness or injury  Possible exposure to STD: acute illness or injury    Amount and/or Complexity of Data Reviewed  Labs: ordered. Decision-making details documented in ED Course.    Risk  Prescription drug management.        Condition upon leaving the department: Stable    Disposition and Plan     Clinical Impression:  1. Acute cystitis without hematuria    2. Possible exposure to STD        Disposition:  Discharge    Follow-up:  Ovidio Lindsey MD  [address] [place] [address]  [phone]    Schedule an appointment as soon as possible for a visit  If symptoms worsen      Medications Prescribed:  Current Discharge Medication List        START  taking these medications    Details   doxycycline 100 MG Oral Cap Take 1 capsule (100 mg total) by mouth 2 (two) times daily for 7 days.  Qty: 14 capsule, Refills: 0

## 2024-09-18 LAB
C TRACH DNA SPEC QL NAA+PROBE: NEGATIVE
N GONORRHOEA DNA SPEC QL NAA+PROBE: NEGATIVE

## 2024-09-19 RX ORDER — CEPHALEXIN 500 MG/1
500 CAPSULE ORAL 2 TIMES DAILY
Qty: 10 CAPSULE | Refills: 0 | Status: SHIPPED | OUTPATIENT
Start: 2024-09-19 | End: 2024-09-24

## 2024-09-20 NOTE — PROGRESS NOTES
ED Culture Callback Results Review    Pharmacist reviewed culture results from ED visit .    Final urine culture positive for E. coli that is not susceptible to previously prescribed Doxycycline (Vibramycin) therapy.     A new prescription for keflex was electronically sent to [facility] as discussed with Dr. Rodriguez. Unable to reach patient after three attempts. A certified letter has been requested.    Aamir Inman PharmD  Emergency Medicine Pharmacist Specialist  09/20/24; 1:24 PM

## 2024-11-29 ENCOUNTER — HOSPITAL ENCOUNTER (EMERGENCY)
Facility: HOSPITAL | Age: 29
Discharge: HOME | End: 2024-11-29
Attending: EMERGENCY MEDICINE

## 2024-11-29 VITALS
SYSTOLIC BLOOD PRESSURE: 123 MMHG | BODY MASS INDEX: 28.35 KG/M2 | HEART RATE: 78 BPM | OXYGEN SATURATION: 100 % | TEMPERATURE: 97 F | DIASTOLIC BLOOD PRESSURE: 86 MMHG | WEIGHT: 160 LBS | HEIGHT: 63 IN | RESPIRATION RATE: 18 BRPM

## 2024-11-29 DIAGNOSIS — L02.213 CHEST WALL ABSCESS: Primary | ICD-10-CM

## 2024-11-29 PROCEDURE — 10061 I&D ABSCESS COMP/MULTIPLE: CPT

## 2024-11-29 PROCEDURE — 99283 EMERGENCY DEPT VISIT LOW MDM: CPT

## 2024-11-29 PROCEDURE — 99284 EMERGENCY DEPT VISIT MOD MDM: CPT

## 2024-11-29 RX ORDER — DOXYCYCLINE 100 MG/1
100 CAPSULE ORAL 2 TIMES DAILY
Qty: 14 CAPSULE | Refills: 0 | Status: SHIPPED | OUTPATIENT
Start: 2024-11-29 | End: 2024-12-06

## 2024-11-29 NOTE — ED PROVIDER NOTES
Patient Seen in: [facility] Emergency Department    History     Chief Complaint   Patient presents with    Abscess       HPI    History is provided by patient/independent historian: patient  29 year old female with hx of breast abscess, here with L breast pain/swelling for the past few days. + redness, no active drainage. No fevers.     History reviewed.   Past Medical History:    Abscess    History of lumbar puncture    Migraines         History reviewed. History reviewed. No pertinent surgical history.      Home Medications reviewed :  Prescriptions Prior to Admission[1]      History reviewed.   Social History     Socioeconomic History    Marital status: Single   Tobacco Use    Smoking status: Never     Passive exposure: Never    Smokeless tobacco: Never   Vaping Use    Vaping status: Never Used   Substance and Sexual Activity    Alcohol use: Yes     Comment: social    Drug use: Not Currently         ROS  Review of Systems   Respiratory:  Negative for shortness of breath.    Cardiovascular:  Negative for chest pain.   Skin:  Positive for wound.   All other systems reviewed and are negative.     All other pertinent organ systems are reviewed and are negative.      Physical Exam     ED Triage Vitals [11/29/24 0800]   /80   Pulse 80   Resp 16   Temp 97 °F (36.1 °C)   Temp src Temporal   SpO2 99 %   O2 Device None (Room air)     Vital signs reviewed.      Physical Exam  Vitals and nursing note reviewed.   Cardiovascular:      Pulses: Normal pulses.   Pulmonary:      Effort: No respiratory distress.   Chest:       Abdominal:      General: There is no distension.   Neurological:      Mental Status: She is alert.         ED Course       Labs:   Labs Reviewed - No data to display      My EKG Interpretation:   As reviewed and Interpreted by me      Imaging Results Available and Reviewed while in ED:   No results found.    Decision rules/scores evaluated: none      Diagnostic labs/tests considered but not  ordered: CBC, BMp, aerobic culture, CXR    ED Medications Administered: Medications - No data to display             MDM       Medical Decision Making      Differential Diagnosis: After obtaining the patient's history, performing the physical exam and reviewing the diagnostics, multiple initial diagnoses were considered based on the presenting problem including breast abscess, sebaceous cyst, cellulitis    External document review: I personally reviewed available external medical records for any recent pertinent discharge summaries, testing, and procedures - the findings are as follows: 9/17/24 visit with Dr. Liriano for cystitis    Complicating Factors: The patient already  has a past medical history of Abscess, History of lumbar puncture, and Migraines. to contribute to the complexity of this ED evaluation.    Procedures performed:   PROCEDURE: Simple/  Incision & Drainage of Abscess  : Jeovanny Herrera MD  Location:  left breast    The patient / caregivers were apprised of diagnostic / treatment options including alternate modes of care, in addition to risks and benefits, for this medical condition. Based on this discussion the patient / caregiver agree with this chosen diagnostic and treatment plan and verbal consent was obtained.    Timeout was performed and the correct patient, site, and location was confirmed prior to initiation.  Sterile prep and drape was preformed.  Local analgesia was obtained using lidocaine 1%.   An incision was made at the point of greatest fluctuance and sanguo- purulent fluid was obtained.  Loculations disrupted.    sterile packing was inserted.  No complications were noted.     Patient instructed to follow up with their PMD or return to the ED for wound check in 3-5 days.      Discussed management with physician/appropriate source: none    Considered admission/deescalation of care for: none    Social determinants of health affecting patient care: none    Prescription  medications considered: doxycycline, discussed continuing current medication regimen    The patient requires continuous monitoring for: breast abscess    Shared decision making: discussed possible admission        Disposition and Plan     Clinical Impression:  1. Chest wall abscess        Disposition:  Discharge    Follow-up:  [facility]    Schedule an appointment as soon as possible for a visit in 3 day(s)  for packing removal      Medications Prescribed:  Current Discharge Medication List                         [1] (Not in a hospital admission)

## 2024-11-29 NOTE — ED INITIAL ASSESSMENT (HPI)
Pt came in for abscess to left breast. Painful to touch per pt. Afebrile. RR even and nonlabored, speaking in full sentences, ambulatory with steady gait.

## 2024-12-02 ENCOUNTER — HOSPITAL ENCOUNTER (EMERGENCY)
Facility: HOSPITAL | Age: 29
Discharge: HOME | End: 2024-12-02
Attending: EMERGENCY MEDICINE

## 2024-12-02 VITALS
HEART RATE: 87 BPM | BODY MASS INDEX: 29.4 KG/M2 | RESPIRATION RATE: 18 BRPM | DIASTOLIC BLOOD PRESSURE: 64 MMHG | WEIGHT: 168 LBS | TEMPERATURE: 98 F | OXYGEN SATURATION: 99 % | SYSTOLIC BLOOD PRESSURE: 115 MMHG | HEIGHT: 63.5 IN

## 2024-12-02 DIAGNOSIS — Z48.00 ABSCESS PACKING REMOVAL: Primary | ICD-10-CM

## 2024-12-02 PROCEDURE — 99281 EMR DPT VST MAYX REQ PHY/QHP: CPT

## 2024-12-02 NOTE — ED INITIAL ASSESSMENT (HPI)
Present to ED for packing removal from left chest. Packing placed on Friday. Denies any s/s of infection. No other complaints.

## 2024-12-02 NOTE — DISCHARGE INSTRUCTIONS
Thank you for seeking care at [facility] Emergency Department.  You have been seen and evaluated and treated for an abscess on a prior ER visit and had packing removed today.     We discussed the results of your evaluation and work-up in the emergency department   Please read the instructions provided   If given prescriptions, take as instructed   Please keep the area clean, and wash the area gently with mild soap and warm water twice daily.    Not all abscesses require antibiotics, but if antibiotics were prescribed, please complete the course of treatment as directed.    Remember, your care process does not end after your visit today. Please follow-up with your doctor within 1-2 days for a follow-up check to ensure your symptoms are improving, to see if you need any further evaluation/testing, or to evaluate for any alternate diagnoses.     If you notice increasing pain, increasing redness around the wound, increasing drainage, feeling lightheaded or dizzy, heavy bleeding, begin having fevers or chills, or feeling very ill you should contact your doctor or return to the emergency department.

## 2024-12-02 NOTE — ED PROVIDER NOTES
[facility] Emergency Department Note  Patient: Gabriela Oconnor Age: 29 year old Sex: female      MRN: [id number]  : 1995    Patient Seen in: [facility] Emergency Department    History   No chief complaint on file.    Stated Complaint: packing removal    History obtained from: patient     Very pleasant 29-year-old female presents to the ER for evaluation of her left chest wall abscess which was packed a few days ago requesting packing removal.  States has still had swelling and drainage.  No fevers, chills, or increased redness.  Has been compliant with antibiotics.  She denies any other complaints at this time.    Review of Systems:  Review of Systems  Positive for stated complaint: packing removal. Constitutional and vital signs reviewed. All other systems reviewed and negative except as noted above.    Patient History:  Past Medical History:    Abscess    History of lumbar puncture    Migraines       History reviewed. No pertinent surgical history.     No family history on file.    Specific Social Determinants of Health:   Social History     Socioeconomic History    Marital status: Single   Tobacco Use    Smoking status: Never     Passive exposure: Never    Smokeless tobacco: Never   Vaping Use    Vaping status: Never Used   Substance and Sexual Activity    Alcohol use: Yes     Comment: social    Drug use: Not Currently     Social Drivers of Health     Food Insecurity: No Food Insecurity (10/26/2023)    [facility]    Food Insecurity     Within the past 30 days, I worried whether my food would run out before I got money to buy more. / En los últimos 30 días, me preocupó que la comida se podía acabar antes de tener dinero para compr...: Never true / Nunca     Within the past 30 days, the food that I bought just didn't last, and I didn't have money to get more. / En los últimos 30 días, La comida que compré no rindió lo suficiente, y no  tenía dinero para...: Never true / Nunca           PSFH elements reviewed from today and agreed except as otherwise stated in HPI.    Physical Exam     ED Triage Vitals [12/02/24 1701]   /64   Pulse 87   Resp 18   Temp 98 °F (36.7 °C)   Temp src    SpO2 99 %   O2 Device None (Room air)       Current:/64   Pulse 87   Temp 98 °F (36.7 °C)   Resp 18   Ht 161.3 cm (5' 3.5\")   Wt 76.2 kg   LMP 11/08/2024 (Exact Date)   SpO2 99%   BMI 29.29 kg/m²         Physical Exam  Vitals and nursing note reviewed.   Constitutional:       General: She is not in acute distress.     Appearance: She is not ill-appearing.   HENT:      Head: Normocephalic and atraumatic.      Right Ear: External ear normal.      Left Ear: External ear normal.      Nose: Nose normal.      Mouth/Throat:      Mouth: Mucous membranes are moist.   Eyes:      Conjunctiva/sclera: Conjunctivae normal.   Cardiovascular:      Rate and Rhythm: Normal rate and regular rhythm.      Heart sounds: No murmur heard.  Pulmonary:      Effort: Pulmonary effort is normal. No respiratory distress.      Breath sounds: No wheezing or rales.   Chest:       Abdominal:      General: There is no distension.   Musculoskeletal:         General: No deformity.   Skin:     General: Skin is warm and dry.      Capillary Refill: Capillary refill takes less than 2 seconds.   Neurological:      General: No focal deficit present.      Mental Status: She is alert.             MDM   Very pleasant 29-year-old female presents for packing removal after recent incision and drainage completed on 11/29/2024 in this ER, and chart review patient had this packing placed after incision and drainage and was discharged on doxycycline.  She has been compliant with antibiotics.  She is well-appearing with normal vitals.  Clinically I do not suspect any worsening of cellulitis or abscess and feel it is improving.  I advised her to continue local wound cares, warm compresses and follow-up  with primary doctor in 2 to 3 days.  Return if new or worsening symptoms occur immediately.  She is comfortable to plan for discharge at this time.  I do not feel any labs or imaging are indicated at this time.              Procedures:  Procedures        Disposition and Plan     Clinical Impression:  1. Abscess packing removal        Disposition:  Discharge    Follow-up:  [facility] [address] [id number] [phone]    Schedule an appointment as soon as possible for a visit in 2 day(s)      [facility] Emergency Department  [address] [facility] [address]  [phone]  Go to  If symptoms worsen, immediately      Medications Prescribed:  Current Discharge Medication List            This note may have been created using voice dictation technology and may include inadvertent errors.      Lorena Da Silva DO  Attending Physician   Emergency Medicine

## 2025-04-26 ENCOUNTER — HOSPITAL ENCOUNTER (EMERGENCY)
Facility: HOSPITAL | Age: 30
Discharge: HOME | End: 2025-04-26
Attending: EMERGENCY MEDICINE

## 2025-04-26 VITALS
DIASTOLIC BLOOD PRESSURE: 81 MMHG | RESPIRATION RATE: 16 BRPM | SYSTOLIC BLOOD PRESSURE: 117 MMHG | WEIGHT: 203.06 LBS | HEIGHT: 63 IN | HEART RATE: 84 BPM | BODY MASS INDEX: 35.98 KG/M2 | TEMPERATURE: 98 F | OXYGEN SATURATION: 100 %

## 2025-04-26 DIAGNOSIS — N30.00 ACUTE CYSTITIS WITHOUT HEMATURIA: Primary | ICD-10-CM

## 2025-04-26 LAB
B-HCG UR QL: NEGATIVE
BILIRUB UR QL: NEGATIVE
GLUCOSE UR-MCNC: NORMAL MG/DL
KETONES UR-MCNC: NEGATIVE MG/DL
LEUKOCYTE ESTERASE UR QL STRIP.AUTO: 500
NITRITE UR QL STRIP.AUTO: NEGATIVE
PH UR: 6 [PH] (ref 5–8)
PROT UR-MCNC: NEGATIVE MG/DL
RBC #/AREA URNS AUTO: >10 /HPF
SP GR UR STRIP: 1.01 (ref 1–1.03)
UROBILINOGEN UR STRIP-ACNC: NORMAL
WBC #/AREA URNS AUTO: >50 /HPF
WBC CLUMPS UR QL AUTO: PRESENT /HPF

## 2025-04-26 PROCEDURE — 99284 EMERGENCY DEPT VISIT MOD MDM: CPT

## 2025-04-26 PROCEDURE — 99283 EMERGENCY DEPT VISIT LOW MDM: CPT

## 2025-04-26 PROCEDURE — 87086 URINE CULTURE/COLONY COUNT: CPT | Performed by: EMERGENCY MEDICINE

## 2025-04-26 PROCEDURE — 81001 URINALYSIS AUTO W/SCOPE: CPT | Performed by: EMERGENCY MEDICINE

## 2025-04-26 PROCEDURE — 81025 URINE PREGNANCY TEST: CPT

## 2025-04-26 PROCEDURE — 87088 URINE BACTERIA CULTURE: CPT | Performed by: EMERGENCY MEDICINE

## 2025-04-26 PROCEDURE — 87186 SC STD MICRODIL/AGAR DIL: CPT | Performed by: EMERGENCY MEDICINE

## 2025-04-26 RX ORDER — CEPHALEXIN 500 MG/1
500 CAPSULE ORAL 3 TIMES DAILY
Qty: 21 CAPSULE | Refills: 0 | Status: SHIPPED | OUTPATIENT
Start: 2025-04-26 | End: 2025-05-03

## 2025-04-26 NOTE — ED PROVIDER NOTES
Patient Seen in: [facility] Emergency Department    History     Chief Complaint   Patient presents with    Eval-G    Urinary Symptoms       HPI    30-year-old female who presents here today complaining of some dysuria along with urinary urgency and frequency for the past couple days.  Feels like when she had a UTI in the past.  Denies any nausea/vomiting/diarrhea.  No chest pain or shortness of breath    History reviewed. Past Medical History[1]    History reviewed. Past Surgical History[2]      Medications :  Prescriptions Prior to Admission[3]     Family History[4]    Smoking Status: Social Hx on file[5]    Constitutional and vital signs reviewed.      Social History and Family History elements reviewed from today, pertinent positives to the presenting problem noted.    Physical Exam     ED Triage Vitals [04/26/25 0653]   /81   Pulse 84   Resp 16   Temp 98.3 °F (36.8 °C)   Temp src Oral   SpO2 100 %   O2 Device None (Room air)       All measures to prevent infection transmission during my interaction with the patient were taken. Handwashing was performed prior to and after the exam.  Stethoscope and any equipment used during my examination was cleaned with super sani-cloth germicidal wipes following the exam.     Physical Exam  Vitals reviewed.   Cardiovascular:      Rate and Rhythm: Normal rate.   Pulmonary:      Effort: Pulmonary effort is normal.   Abdominal:      Comments: Mild Suprapubic tenderness with palpation   Skin:     General: Skin is warm and dry.   Neurological:      Mental Status: She is alert and oriented to person, place, and time.         ED Course        Labs Reviewed   URINALYSIS WITH CULTURE REFLEX - Abnormal; Notable for the following components:       Result Value    Clarity Urine Turbid (*)     Blood Urine 2+ (*)     Leukocyte Esterase Urine 500 (*)     WBC Urine >50 (*)     RBC Urine >10 (*)     Bacteria Urine Rare (*)     Squamous Epi. Cells Few (*)     WBC Clump Present  (*)     All other components within normal limits   POCT PREGNANCY URINE - Normal   URINE CULTURE, ROUTINE       As Interpreted by me    Imaging Results Available and Reviewed while in ED: No results found.  ED Medications Administered: Medications - No data to display      MDM     Vitals:    04/26/25 0653   BP: 117/81   Pulse: 84   Resp: 16   Temp: 98.3 °F (36.8 °C)   TempSrc: Oral   SpO2: 100%   Weight: 92.1 kg   Height: 160 cm (5' 3\")     *I personally reviewed and interpreted all ED vitals.    Pulse Ox: 100%, Room air, Normal       Differential Diagnosis/ Diagnostic Considerations: GI, pyelonephritis, sterile pyuria    Complicating Factors: The patient already has does not have a problem list on file. to contribute to the complexity of this ED evaluation.    Medical Decision Making  Amount and/or Complexity of Data Reviewed  External Data Reviewed: labs.     Details: Also look for previous cultures with sensitivity results  Labs: ordered. Decision-making details documented in ED Course.    Risk  OTC drugs.  Prescription drug management.    I reviewed all results with the patient.  Patient does have a UTI.  Will treat with Keflex.  Reviewed previous labs and saw susceptibility to Keflex previously.  I explained the patient to take the antibiotics as prescribed.  Follow-up with her primary care provider in 1 to 2 days.  Return to the ER if symptoms continue, get worse, unable to follow-up    Condition upon leaving the department: Stable    Disposition and Plan     Clinical Impression:  1. Acute cystitis without hematuria        Disposition:  Discharge    Follow-up:  Elaine Win MD  [address] [place] [id number]  [phone]    Follow up        Medications Prescribed:  Discharge Medication List as of 4/26/2025  7:49 AM        START taking these medications    Details   cephALEXin 500 MG Oral Cap Take 1 capsule (500 mg total) by mouth 3 (three) times daily for 7 days., Normal, Disp-21 capsule, R-0                               [1]   Past Medical History:   Abscess    History of lumbar puncture    Migraines   [2] History reviewed. No pertinent surgical history.  [3] (Not in a hospital admission)   [4] No family history on file.  [5]   Social History  Socioeconomic History    Marital status: Single   Tobacco Use    Smoking status: Never     Passive exposure: Never    Smokeless tobacco: Never   Vaping Use    Vaping status: Never Used   Substance and Sexual Activity    Alcohol use: Yes     Comment: social    Drug use: Not Currently

## 2025-04-26 NOTE — ED INITIAL ASSESSMENT (HPI)
Patient states that she has cloudy urine and has frequency and urgency. Denies at burning on urination. Patient reports lower abdominal pain that goes into her groin.

## 2025-04-26 NOTE — DISCHARGE INSTRUCTIONS
Antibiotics as prescribed.  Follow-up with your primary care provider 1 to 2 days.  Return to the ER if some continue, get worse, unable to follow-up

## (undated) NOTE — LETTER
Date & Time: 12/2/2024, 5:39 PM  Patient: Gabriela Oconnor  Encounter Provider(s):    Lorena Da Silva DO       To Whom It May Concern:    Gabriela Oconnor was seen and treated in our department on 12/2/2024. She can return to work.    If you have any questions or concerns, please do not hesitate to call.        _____________________________  Physician/APC Signature

## (undated) NOTE — LETTER
December 18, 2018    Patient: Arcelia Bejarano   Date of Visit: 12/18/2018       To Whom It May Concern:    Arcelia Bejarano was seen and treated in our emergency department on 12/18/2018. She can return to work.     If you have any questions or concerns,

## (undated) NOTE — ED AVS SNAPSHOT
Arcelia Bejarano   MRN: [id number]    Department:  [facility] Emergency Department   Date of Visit:  12/18/2018           Disclosure     Insurance plans vary and the physician(s) referred by the ER may not be covered by your plan.  Please contact CARE PHYSICIAN AT ONCE OR RETURN IMMEDIATELY TO THE EMERGENCY DEPARTMENT. If you have been prescribed any medication(s), please fill your prescription right away and begin taking the medication(s) as directed.   If you believe that any of the medications

## (undated) NOTE — LETTER
Date & Time: 10/23/2023, 3:05 PM  Patient: Dayna Scanlon  Encounter Provider(s):    Meghan Garcia MD       To Whom It May Concern:    Dayna Scanlon was seen and treated in our department on 10/23/2023. She should not return to work until 10/27/2023 .     If you have any questions or concerns, please do not hesitate to call.        _____________________________  Physician/[facility] Signature

## (undated) NOTE — LETTER
Date & Time: 9/23/2024, 9:26 AM  Patient: Gabriela Oconnor    Dear Gabriela Oconnor,    After numerous attempts, we have been unable to contact you via telephone. We are trying to reach you to review new test results and to provide an update in your treatment plan.        Please contact the Emergency Department charge nurse at [phone].    Sincerely,    [facility]

## (undated) NOTE — ED AVS SNAPSHOT
Myron Downs   MRN: [id number]    Department:  [facility] Emergency Department   Date of Visit:  7/26/2019           Disclosure     Insurance plans vary and the physician(s) referred by the ER may not be covered by your plan.  Please contact CARE PHYSICIAN AT ONCE OR RETURN IMMEDIATELY TO THE EMERGENCY DEPARTMENT. If you have been prescribed any medication(s), please fill your prescription right away and begin taking the medication(s) as directed.   If you believe that any of the medications

## (undated) NOTE — LETTER
Date & Time: 11/29/2024, 9:42 AM  Patient: Gabriela Oconnor  Encounter Provider(s):    Jeovanny Herrera MD       To Whom It May Concern:    Gabriela Oconnor was seen and treated in our department on 11/29/2024. She can return to work with these limitations: Light duty until 12/03/2024 .    If you have any questions or concerns, please do not hesitate to call.        _____________________________  RN Signature

## (undated) NOTE — LETTER
Date & Time: 7/26/2019, 5:55 PM  Patient: Deon Wolfe  Encounter Provider(s):    DHAVAL Lee       To Whom It May Concern:    Deon Wolfe was seen and treated in our department on 7/26/2019.  She should not return to work until cleared